# Patient Record
Sex: MALE | Race: WHITE
[De-identification: names, ages, dates, MRNs, and addresses within clinical notes are randomized per-mention and may not be internally consistent; named-entity substitution may affect disease eponyms.]

---

## 2017-02-27 ENCOUNTER — HOSPITAL ENCOUNTER (OUTPATIENT)
Dept: HOSPITAL 57 - BURRAD | Age: 72
Discharge: HOME | End: 2017-02-27
Attending: FAMILY MEDICINE
Payer: MEDICARE

## 2017-02-27 DIAGNOSIS — R05: Primary | ICD-10-CM

## 2017-02-27 PROCEDURE — 71020: CPT

## 2017-02-27 NOTE — RAD
CHEST TWO VIEWS:

 

Date:  2-27-17

 

Comparison:  1-29-09

 

FINDINGS: 

The heart is normal in size.  The lungs are clear.  No acute infiltrate or effusion was seen.  There
 is no vascular congestion.  Median sternotomy sutures are noted from prior surgery.  The patient al
so appears to have a low anterior cervical fusion.  

 

IMPRESSION: 

No acute thoracic finding. 

 

POS: HOME

## 2022-12-05 ENCOUNTER — HOSPITAL ENCOUNTER (OUTPATIENT)
Dept: HOSPITAL 92 - CSHULT | Age: 77
Discharge: HOME | End: 2022-12-05
Attending: INTERNAL MEDICINE
Payer: MEDICARE

## 2022-12-05 VITALS — SYSTOLIC BLOOD PRESSURE: 150 MMHG | DIASTOLIC BLOOD PRESSURE: 75 MMHG | TEMPERATURE: 97.2 F

## 2022-12-05 VITALS — BODY MASS INDEX: 29.8 KG/M2

## 2022-12-05 DIAGNOSIS — K74.00: ICD-10-CM

## 2022-12-05 DIAGNOSIS — R79.89: ICD-10-CM

## 2022-12-05 DIAGNOSIS — K76.0: Primary | ICD-10-CM

## 2022-12-05 LAB
BASOPHILS # BLD AUTO: 0 10X3/UL (ref 0–0.2)
BASOPHILS NFR BLD AUTO: 1 % (ref 0–2)
EOSINOPHIL # BLD AUTO: 0.2 10X3/UL (ref 0–0.5)
EOSINOPHIL NFR BLD AUTO: 4.5 % (ref 0–6)
HGB BLD-MCNC: 14 G/DL (ref 13.5–17.5)
INR PPP: 1.1
LYMPHOCYTES NFR BLD AUTO: 34.8 % (ref 18–47)
MCH RBC QN AUTO: 30.6 PG (ref 27–33)
MCV RBC AUTO: 86.4 FL (ref 81.2–95.1)
MONOCYTES # BLD AUTO: 0.4 10X3/UL (ref 0–1.1)
MONOCYTES NFR BLD AUTO: 9.2 % (ref 0–10)
NEUTROPHILS # BLD AUTO: 1.9 10X3/UL (ref 1.5–8.4)
NEUTROPHILS NFR BLD AUTO: 50.2 % (ref 40–75)
PLATELET # BLD AUTO: 108 10X3/UL (ref 150–450)
PROTHROMBIN TIME: 12.3 SEC (ref 9.5–12.1)
RBC # BLD AUTO: 4.57 10X6/UL (ref 4.32–5.72)
WBC # BLD AUTO: 3.8 10X3/UL (ref 3.5–10.5)

## 2022-12-05 PROCEDURE — 88307 TISSUE EXAM BY PATHOLOGIST: CPT

## 2022-12-05 PROCEDURE — 99152 MOD SED SAME PHYS/QHP 5/>YRS: CPT

## 2022-12-05 PROCEDURE — 85610 PROTHROMBIN TIME: CPT

## 2022-12-05 PROCEDURE — 88313 SPECIAL STAINS GROUP 2: CPT

## 2022-12-05 PROCEDURE — 0F923ZX DRAINAGE OF LEFT LOBE LIVER, PERCUTANEOUS APPROACH, DIAGNOSTIC: ICD-10-PCS | Performed by: RADIOLOGY

## 2022-12-05 PROCEDURE — 85025 COMPLETE CBC W/AUTO DIFF WBC: CPT

## 2022-12-05 PROCEDURE — 76942 ECHO GUIDE FOR BIOPSY: CPT

## 2022-12-05 PROCEDURE — 47000 NEEDLE BIOPSY OF LIVER PERQ: CPT
